# Patient Record
Sex: FEMALE | Race: WHITE | NOT HISPANIC OR LATINO | Employment: FULL TIME | URBAN - METROPOLITAN AREA
[De-identification: names, ages, dates, MRNs, and addresses within clinical notes are randomized per-mention and may not be internally consistent; named-entity substitution may affect disease eponyms.]

---

## 2024-02-07 NOTE — PROGRESS NOTES
History of Present Illness - Janene Vincent is a very pleasant 23 year old female here to see me for the first time for two separate issues of the head and neck, ear pain and also nasal obstruction    She tells me that for her entire life she has had trouble with breathing through her nose.  It has also led to mouth breathing and throat irritation. She reports a lot of chronic sinus pressure as well. The ears also seem to be stuffy and pop when she swallows as well.  She also reports a lot of post nasal drainage.  Nasal sprays seem to burn her nose.    She had a lot of ear infections as child but never had tubes.  She has been allergy tested in December 2023.  She has a cat and dog and she has allergies to both.      She works at [employer], and she has noted a harder time hearing people.    Past Medical History - There is no problem list on file for this patient.      Current Medications - No current outpatient medications on file.    Allergies - Not on File    Social History -   Social History     Socioeconomic History    Marital status: Single       Family History - No family history on file.    Review of Systems - As per HPI and PMHx, otherwise 10+ system review of the head and neck, and general constitution is negative.    Physical Exam  /73   Pulse 95   SpO2 100%       General - The patient is well nourished and well developed, and appears to have good nutritional status.  Alert and oriented to person and place, answers questions and cooperates with examination appropriately.   Head and Face - Normocephalic and atraumatic, with no gross asymmetry noted of the contour of the facial features.  The facial nerve is intact, with strong symmetric movements.  Voice and Breathing - The patient was breathing comfortably without the use of accessory muscles. There was no wheezing, stridor, or stertor.  The patients voice was clear and strong, and had appropriate pitch and quality.  Ears - The tympanic  membranes are normal in appearance, bony landmarks are intact.  No retraction, perforation, or masses.  No fluid or purulence was seen in the external canal or the middle ear. No evidence of infection of the middle ear or external canal, cerumen was normal in appearance.  Eyes - Extraocular movements intact, and the pupils were reactive to light.  Sclera were not icteric or injected, conjunctiva were pink and moist.  Mouth - Examination of the oral cavity showed pink, healthy oral mucosa. No lesions or ulcerations noted.  The tongue was mobile and midline, and the dentition were in good condition.    Throat - The walls of the oropharynx were smooth, pink, moist, symmetric, and had no lesions or ulcerations.  The tonsillar pillars and soft palate were symmetric.  The uvula was midline on elevation.    Neck - Normal midline excursion of the laryngotracheal complex during swallowing.  Full range of motion on passive movement.  Palpation of the occipital, submental, submandibular, internal jugular chain, and supraclavicular nodes did not demonstrate any abnormal lymph nodes or masses.  The carotid pulse was palpable bilaterally.  Palpation of the thyroid was soft and smooth, with no nodules or goiter appreciated.  The trachea was mobile and midline.  Nose - External contour is symmetric, no gross deflection or scars.  Nasal mucosa is globally boggy and edematous, with possibly polypoid changes on the LEFT middle meatus..  The septum was very deviated to the RIGHT and highly obstructive.    Audiology - The testing today showed bilateral symmetric normal nerve lines but with significant 10-20dB conductive hearing loss in both ears across the frequencies.  The tympanograms were negative bilaterally as well.      A/P - Janene Vincent is a 23 year old female  (H90.0) Conductive hearing loss, bilateral  (primary encounter diagnosis)  (H69.93) Dysfunction of both eustachian tubes  (J31.0) Chronic rhinitis      I bleieve that  the nasal symptoms and eustachian tube dysfunction leading to conductive hearing loss are related.  I would like to get a CT sinus for more information.    In the meantime, a course of prednisone and antibiotics will be started, but ideally she can get the CT scan before starting the medications.    I have given her a NeilMed kit pre empitvely

## 2024-02-12 ENCOUNTER — OFFICE VISIT (OUTPATIENT)
Dept: OTOLARYNGOLOGY | Facility: CLINIC | Age: 24
End: 2024-02-12
Payer: COMMERCIAL

## 2024-02-12 ENCOUNTER — OFFICE VISIT (OUTPATIENT)
Dept: AUDIOLOGY | Facility: CLINIC | Age: 24
End: 2024-02-12
Payer: COMMERCIAL

## 2024-02-12 VITALS — DIASTOLIC BLOOD PRESSURE: 73 MMHG | OXYGEN SATURATION: 100 % | HEART RATE: 95 BPM | SYSTOLIC BLOOD PRESSURE: 104 MMHG

## 2024-02-12 DIAGNOSIS — J31.0 CHRONIC RHINITIS: ICD-10-CM

## 2024-02-12 DIAGNOSIS — H69.93 EUSTACHIAN TUBE DYSFUNCTION, BILATERAL: Primary | ICD-10-CM

## 2024-02-12 DIAGNOSIS — H90.0 CONDUCTIVE HEARING LOSS, BILATERAL: Primary | ICD-10-CM

## 2024-02-12 DIAGNOSIS — H69.93 DYSFUNCTION OF BOTH EUSTACHIAN TUBES: ICD-10-CM

## 2024-02-12 PROCEDURE — 92557 COMPREHENSIVE HEARING TEST: CPT

## 2024-02-12 PROCEDURE — 92550 TYMPANOMETRY & REFLEX THRESH: CPT | Mod: 52

## 2024-02-12 PROCEDURE — 99204 OFFICE O/P NEW MOD 45 MIN: CPT | Performed by: OTOLARYNGOLOGY

## 2024-02-12 ASSESSMENT — PAIN SCALES - GENERAL: PAINLEVEL: NO PAIN (0)

## 2024-02-12 NOTE — PROGRESS NOTES
AUDIOLOGY REPORT:    Patient was referred to [facility] from ENT by Dr. Pugh for a hearing examination. Patient is here today with concerns regarding bilateral ear pain and pressure. She notes an occasional decline in hearing and a history of ear infections as a child. Janene denies drainage, dizziness, history of ear surgeries, family history of hearing loss and history of noise exposure. They were accompanied today by their grandmother.    Testing:    Otoscopy:   Otoscopic exam indicates ears are clear of cerumen bilaterally     Tympanograms:    RIGHT: negative pressure      LEFT:   negative pressure   *Lost in transfer    Reflexes (reported by stimulus ear): 1000 Hz  RIGHT: Ipsilateral is absent at frequencies tested  RIGHT: Contralateral is absent at frequencies tested  LEFT:   Ipsilateral is absent at frequencies tested  LEFT:   Contralateral is absent at frequencies tested  *Lost in transfer    Thresholds:   Pure Tone Thresholds assessed using conventional audiometry with good  reliability from 250-8000 Hz bilaterally using insert earphones and circumaural headphones     RIGHT:  borderline-normal mixed hearing loss rising to normal    LEFT:    mild mixed hearing loss rising to normal    Speech Reception Threshold:    RIGHT: 20 dB HL    LEFT:   20 dB HL  Speech Reception Thresholds are in good agreement with pure tone thresholds    Word Recognition Score:     RIGHT: 96% at 60 dB HL using NU-6 recorded word list.    LEFT:   100% at 60 dB HL using NU-6 recorded word list.    Discussed results with the patient.     Patient was returned to ENT for follow up.     Kasandra Bonner, [facility]  Licensed Audiologist  [place] #[id number]    02/12/24

## 2024-02-12 NOTE — LETTER
2/12/2024         RE: Janene Vincent  [address] [facility] [address]  [place] [address]        Dear Colleague,    Thank you for referring your patient, Janene Vincent, to the [facility]. Please see a copy of my visit note below.    History of Present Illness - Janene Vinecnt is a very pleasant 23 year old female here to see me for the first time for two separate issues of the head and neck, ear pain and also nasal obstruction    She tells me that for her entire life she has had trouble with breathing through her nose.  It has also led to mouth breathing and throat irritation. She reports a lot of chronic sinus pressure as well. The ears also seem to be stuffy and pop when she swallows as well.  She also reports a lot of post nasal drainage.  Nasal sprays seem to burn her nose.    She had a lot of ear infections as child but never had tubes.  She has been allergy tested in December 2023.  She has a cat and dog and she has allergies to both.      She works at [employer], and she has noted a harder time hearing people.    Past Medical History - There is no problem list on file for this patient.      Current Medications - No current outpatient medications on file.    Allergies - Not on File    Social History -   Social History     Socioeconomic History     Marital status: Single       Family History - No family history on file.    Review of Systems - As per HPI and PMHx, otherwise 10+ system review of the head and neck, and general constitution is negative.    Physical Exam  /73   Pulse 95   SpO2 100%       General - The patient is well nourished and well developed, and appears to have good nutritional status.  Alert and oriented to person and place, answers questions and cooperates with examination appropriately.   Head and Face - Normocephalic and atraumatic, with no gross asymmetry noted of the contour of the facial features.  The facial nerve is intact, with strong symmetric  movements.  Voice and Breathing - The patient was breathing comfortably without the use of accessory muscles. There was no wheezing, stridor, or stertor.  The patients voice was clear and strong, and had appropriate pitch and quality.  Ears - The tympanic membranes are normal in appearance, bony landmarks are intact.  No retraction, perforation, or masses.  No fluid or purulence was seen in the external canal or the middle ear. No evidence of infection of the middle ear or external canal, cerumen was normal in appearance.  Eyes - Extraocular movements intact, and the pupils were reactive to light.  Sclera were not icteric or injected, conjunctiva were pink and moist.  Mouth - Examination of the oral cavity showed pink, healthy oral mucosa. No lesions or ulcerations noted.  The tongue was mobile and midline, and the dentition were in good condition.    Throat - The walls of the oropharynx were smooth, pink, moist, symmetric, and had no lesions or ulcerations.  The tonsillar pillars and soft palate were symmetric.  The uvula was midline on elevation.    Neck - Normal midline excursion of the laryngotracheal complex during swallowing.  Full range of motion on passive movement.  Palpation of the occipital, submental, submandibular, internal jugular chain, and supraclavicular nodes did not demonstrate any abnormal lymph nodes or masses.  The carotid pulse was palpable bilaterally.  Palpation of the thyroid was soft and smooth, with no nodules or goiter appreciated.  The trachea was mobile and midline.  Nose - External contour is symmetric, no gross deflection or scars.  Nasal mucosa is globally boggy and edematous, with possibly polypoid changes on the LEFT middle meatus..  The septum was very deviated to the RIGHT and highly obstructive.    Audiology - The testing today showed bilateral symmetric normal nerve lines but with significant 10-20dB conductive hearing loss in both ears across the frequencies.  The  tympanograms were negative bilaterally as well.      A/P - Janene Vincent is a 23 year old female  (H90.0) Conductive hearing loss, bilateral  (primary encounter diagnosis)  (H69.93) Dysfunction of both eustachian tubes  (J31.0) Chronic rhinitis      I bleieve that the nasal symptoms and eustachian tube dysfunction leading to conductive hearing loss are related.  I would like to get a CT sinus for more information.    In the meantime, a course of prednisone and antibiotics will be started, but ideally she can get the CT scan before starting the medications.    I have given her a NeilMed kit pre empitvely      Again, thank you for allowing me to participate in the care of your patient.        Sincerely,        Mauri Pugh MD

## 2024-02-22 ENCOUNTER — HOSPITAL ENCOUNTER (OUTPATIENT)
Dept: CT IMAGING | Facility: HOSPITAL | Age: 24
Discharge: HOME | End: 2024-02-22
Attending: OTOLARYNGOLOGY | Admitting: OTOLARYNGOLOGY
Payer: COMMERCIAL

## 2024-02-22 DIAGNOSIS — H90.0 CONDUCTIVE HEARING LOSS, BILATERAL: ICD-10-CM

## 2024-02-22 DIAGNOSIS — J31.0 CHRONIC RHINITIS: ICD-10-CM

## 2024-02-22 DIAGNOSIS — H69.93 DYSFUNCTION OF BOTH EUSTACHIAN TUBES: ICD-10-CM

## 2024-02-22 PROCEDURE — 70486 CT MAXILLOFACIAL W/O DYE: CPT

## 2024-02-27 ENCOUNTER — MYC MEDICAL ADVICE (OUTPATIENT)
Dept: OTOLARYNGOLOGY | Facility: CLINIC | Age: 24
End: 2024-02-27
Payer: COMMERCIAL

## 2024-02-27 DIAGNOSIS — J31.0 CHRONIC RHINITIS: Primary | ICD-10-CM

## 2024-02-27 DIAGNOSIS — J34.2 DEVIATED NASAL SEPTUM: ICD-10-CM

## 2024-02-27 DIAGNOSIS — H69.93 DYSFUNCTION OF BOTH EUSTACHIAN TUBES: ICD-10-CM

## 2024-02-27 RX ORDER — BUDESONIDE 0.5 MG/2ML
INHALANT ORAL
Qty: 60 ML | Refills: 12 | Status: SHIPPED | OUTPATIENT
Start: 2024-02-27

## 2024-02-29 ENCOUNTER — TELEPHONE (OUTPATIENT)
Dept: OTOLARYNGOLOGY | Facility: CLINIC | Age: 24
End: 2024-02-29
Payer: COMMERCIAL

## 2024-03-10 ENCOUNTER — HEALTH MAINTENANCE LETTER (OUTPATIENT)
Age: 24
End: 2024-03-10

## 2025-03-16 ENCOUNTER — HEALTH MAINTENANCE LETTER (OUTPATIENT)
Age: 25
End: 2025-03-16